# Patient Record
Sex: FEMALE | Race: WHITE | NOT HISPANIC OR LATINO | URBAN - METROPOLITAN AREA
[De-identification: names, ages, dates, MRNs, and addresses within clinical notes are randomized per-mention and may not be internally consistent; named-entity substitution may affect disease eponyms.]

---

## 2021-05-28 ENCOUNTER — OFFICE VISIT (OUTPATIENT)
Dept: URBAN - METROPOLITAN AREA CLINIC 94 | Facility: CLINIC | Age: 31
End: 2021-05-28
Payer: COMMERCIAL

## 2021-05-28 ENCOUNTER — OFFICE VISIT (OUTPATIENT)
Dept: URBAN - METROPOLITAN AREA CLINIC 94 | Facility: CLINIC | Age: 31
End: 2021-05-28

## 2021-05-28 ENCOUNTER — WEB ENCOUNTER (OUTPATIENT)
Dept: URBAN - METROPOLITAN AREA CLINIC 94 | Facility: CLINIC | Age: 31
End: 2021-05-28

## 2021-05-28 VITALS
HEIGHT: 70 IN | DIASTOLIC BLOOD PRESSURE: 65 MMHG | TEMPERATURE: 97.9 F | HEART RATE: 95 BPM | BODY MASS INDEX: 19.9 KG/M2 | WEIGHT: 139 LBS | SYSTOLIC BLOOD PRESSURE: 107 MMHG

## 2021-05-28 DIAGNOSIS — K59.01 CONSTIPATION BY DELAYED COLONIC TRANSIT: ICD-10-CM

## 2021-05-28 DIAGNOSIS — R79.89 ELEVATED LFTS: ICD-10-CM

## 2021-05-28 DIAGNOSIS — R10.9 UNSPECIFIED ABDOMINAL PAIN: ICD-10-CM

## 2021-05-28 DIAGNOSIS — G89.29 OTHER CHRONIC PAIN: ICD-10-CM

## 2021-05-28 PROBLEM — 82423001: Status: ACTIVE | Noted: 2021-05-28

## 2021-05-28 PROCEDURE — 99204 OFFICE O/P NEW MOD 45 MIN: CPT | Performed by: INTERNAL MEDICINE

## 2021-05-28 RX ORDER — HYDROCODONE BITARTRATE AND ACETAMINOPHEN 5; 325 MG/1; MG/1
1 TABLET AS NEEDED TABLET ORAL
Status: ACTIVE | COMMUNITY

## 2021-05-28 RX ORDER — DOXYCYCLINE HYCLATE 100 MG/1
1 TABLET CAPSULE, GELATIN COATED ORAL
Status: ACTIVE | COMMUNITY

## 2021-05-28 RX ORDER — ALBUTEROL SULFATE 90 UG/1
1 PUFF AS NEEDED AEROSOL, METERED RESPIRATORY (INHALATION)
Status: ACTIVE | COMMUNITY

## 2021-05-28 NOTE — HPI-TODAY'S VISIT:
32 y/o F with hx of endometriosis, chronic abdominal pain(question of SB Crohn's) referred here for elevated LFT's  Patient with acute onset fever/body aches, dysuria and b/l flank pain for a week. Seen by PCP and urgent care with negative UCx, COVID, X-ray chest, CT stone protocol, . Treated with abx(macrobid, doxycycline) without benefit. Normal CBC, though LFT's elevated with , ,  with rest normal, acute viral hepatitis negative. CT a/p with question of early R pyelonephritis with similar on ultrasound abd(normal liver) 05/22/21, 08/2020 and prior all normal LFT's.   Today, reports still has fevers, along with some anxiety now that causes some chest tightness(none with exertion) and headache. Is taking doxycycline  Has chronic abdominal pain along with constipation with a hard BM every couple of days. Patient had ileal erosions on colonoscopy, positive IBD serology when seen by [facility] in 2013 with question of Crohn's disease. Seen by [facility] in 2017 with recommendation for CTE, EGD/CLS though not done  Colonoscopy 02/2012: terminal ileal erosions also seen on pill cam at that same time. No granuloma with normal ascending/transverse colon biopsies Pill cam 02/23/12: ileal erosion EGD 2007 and 2011 reportedly unremarkable per [facility] note(mild gastritis), though no reports available

## 2021-07-14 ENCOUNTER — OFFICE VISIT (OUTPATIENT)
Dept: URBAN - METROPOLITAN AREA CLINIC 94 | Facility: CLINIC | Age: 31
End: 2021-07-14

## 2021-09-14 ENCOUNTER — OFFICE VISIT (OUTPATIENT)
Dept: URBAN - METROPOLITAN AREA CLINIC 115 | Facility: CLINIC | Age: 31
End: 2021-09-14

## 2021-11-03 ENCOUNTER — LAB OUTSIDE AN ENCOUNTER (OUTPATIENT)
Dept: URBAN - METROPOLITAN AREA TELEHEALTH 2 | Facility: TELEHEALTH | Age: 31
End: 2021-11-03

## 2021-11-03 ENCOUNTER — OFFICE VISIT (OUTPATIENT)
Dept: URBAN - METROPOLITAN AREA TELEHEALTH 2 | Facility: TELEHEALTH | Age: 31
End: 2021-11-03
Payer: COMMERCIAL

## 2021-11-03 DIAGNOSIS — K63.3 ILEAL EROSIONS: ICD-10-CM

## 2021-11-03 DIAGNOSIS — R11.0 NAUSEA: ICD-10-CM

## 2021-11-03 DIAGNOSIS — K59.01 CONSTIPATION BY DELAYED COLONIC TRANSIT: ICD-10-CM

## 2021-11-03 DIAGNOSIS — R10.84 GENERALIZED ABDOMINAL PAIN: ICD-10-CM

## 2021-11-03 PROBLEM — 35298007: Status: ACTIVE | Noted: 2021-05-28

## 2021-11-03 PROCEDURE — 1036F TOBACCO NON-USER: CPT | Performed by: INTERNAL MEDICINE

## 2021-11-03 PROCEDURE — 99202 OFFICE O/P NEW SF 15 MIN: CPT | Performed by: INTERNAL MEDICINE

## 2021-11-03 PROCEDURE — G8427 DOCREV CUR MEDS BY ELIG CLIN: HCPCS | Performed by: INTERNAL MEDICINE

## 2021-11-03 PROCEDURE — 99213 OFFICE O/P EST LOW 20 MIN: CPT | Performed by: INTERNAL MEDICINE

## 2021-11-03 PROCEDURE — G9903 PT SCRN TBCO ID AS NON USER: HCPCS | Performed by: INTERNAL MEDICINE

## 2021-11-03 RX ORDER — BISACODYL 5 MG
1 -3 TABLETS EACH NIGHT FOR 3 NIGHTS BEFORE STARTING PREP TABLET, DELAYED RELEASE (ENTERIC COATED) ORAL ONCE A DAY
Qty: 9 TABLET | Refills: 0 | OUTPATIENT
Start: 2021-11-03 | End: 2021-11-06

## 2021-11-03 RX ORDER — SODIUM PICOSULFATE, MAGNESIUM OXIDE, AND ANHYDROUS CITRIC ACID 10; 3.5; 12 MG/160ML; G/160ML; G/160ML
TAKE 160 ML LIQUID ORAL
Qty: 1 KIT | Refills: 1 | OUTPATIENT
Start: 2021-11-03 | End: 2021-11-05

## 2021-11-03 RX ORDER — DOXYCYCLINE HYCLATE 100 MG/1
1 TABLET CAPSULE, GELATIN COATED ORAL
Status: ACTIVE | COMMUNITY

## 2021-11-03 RX ORDER — ONDANSETRON HYDROCHLORIDE 4 MG/1
1 TABLET 15 MIN BEFORE EACH DOSE OF COLON PREP TABLET, FILM COATED ORAL TWICE A DAY
Qty: 2 TABLET | Refills: 0 | OUTPATIENT
Start: 2021-11-03

## 2021-11-03 RX ORDER — CHLORDIAZEPOXIDE HYDROCHLORIDE AND CLIDINIUM BROMIDE 5; 2.5 MG/1; MG/1
1 CAPSULE BEFORE MEALS CAPSULE ORAL THREE TIMES A DAY
Qty: 90 | OUTPATIENT
Start: 2021-11-03 | End: 2021-12-02

## 2021-11-03 RX ORDER — ALBUTEROL SULFATE 90 UG/1
1 PUFF AS NEEDED AEROSOL, METERED RESPIRATORY (INHALATION)
Status: ACTIVE | COMMUNITY

## 2021-11-03 NOTE — HPI-TODAY'S VISIT:
Today on 11/3/21, Patient is a 31 year old female present for office visit for evaluation of abdominal pain. Had swollen liver and abnormal kidney tests in May. Told by Id that she must have had a virus. Still has to take tramadol and zofran daily.  Does very bland diet and does not need meds but at other times cannot eat like a normal person.  Pain is usually just uncomfortable. in lower abdomen. Stabbing, Uses a thermopad so she can stand at work. Has tenderness and has nausea. Did everwell food sensitivity test. Did think about elimination diet again when sx bad. Was sensitive to egg white asparagus, chicken, and many other items. can eat plain turkey, plain beef, rice, lettuce, had abnormal ct in 5/2021 and hydroureter not followed up though told no infection at [facility].omeprazole caused hair loss, no relief in past from H2 or levsin  or possibly dicyclomine.  May 2021 labs; , Time spent with patient: -- mins

## 2021-11-24 PROBLEM — 102614006 GENERALIZED ABDOMINAL PAIN: Status: ACTIVE | Noted: 2021-11-24

## 2021-12-23 ENCOUNTER — OFFICE VISIT (OUTPATIENT)
Dept: URBAN - METROPOLITAN AREA SURGERY CENTER 16 | Facility: SURGERY CENTER | Age: 31
End: 2021-12-23
Payer: COMMERCIAL

## 2021-12-23 DIAGNOSIS — K29.60 ADENOPAPILLOMATOSIS GASTRICA: ICD-10-CM

## 2021-12-23 DIAGNOSIS — K63.89 BACTERIAL OVERGROWTH SYNDROME: ICD-10-CM

## 2021-12-23 DIAGNOSIS — K21.00 ALKALINE REFLUX ESOPHAGITIS: ICD-10-CM

## 2021-12-23 DIAGNOSIS — K50.00 CICATRIZING ENTEROCOLITIS: ICD-10-CM

## 2021-12-23 PROCEDURE — G8907 PT DOC NO EVENTS ON DISCHARG: HCPCS | Performed by: INTERNAL MEDICINE

## 2021-12-23 PROCEDURE — 45380 COLONOSCOPY AND BIOPSY: CPT | Performed by: INTERNAL MEDICINE

## 2021-12-23 PROCEDURE — 43239 EGD BIOPSY SINGLE/MULTIPLE: CPT | Performed by: INTERNAL MEDICINE

## 2021-12-23 RX ORDER — ALBUTEROL SULFATE 90 UG/1
1 PUFF AS NEEDED AEROSOL, METERED RESPIRATORY (INHALATION)
Status: ACTIVE | COMMUNITY

## 2021-12-23 RX ORDER — ONDANSETRON HYDROCHLORIDE 4 MG/1
1 TABLET 15 MIN BEFORE EACH DOSE OF COLON PREP TABLET, FILM COATED ORAL TWICE A DAY
Qty: 2 TABLET | Refills: 0 | Status: ACTIVE | COMMUNITY
Start: 2021-11-03

## 2021-12-23 RX ORDER — DOXYCYCLINE HYCLATE 100 MG/1
1 TABLET CAPSULE, GELATIN COATED ORAL
Status: ACTIVE | COMMUNITY

## 2022-01-26 ENCOUNTER — DASHBOARD ENCOUNTERS (OUTPATIENT)
Age: 32
End: 2022-01-26

## 2022-01-26 ENCOUNTER — LAB OUTSIDE AN ENCOUNTER (OUTPATIENT)
Dept: URBAN - METROPOLITAN AREA TELEHEALTH 2 | Facility: TELEHEALTH | Age: 32
End: 2022-01-26

## 2022-01-26 ENCOUNTER — OFFICE VISIT (OUTPATIENT)
Dept: URBAN - METROPOLITAN AREA TELEHEALTH 2 | Facility: TELEHEALTH | Age: 32
End: 2022-01-26
Payer: COMMERCIAL

## 2022-01-26 DIAGNOSIS — R11.0 NAUSEA: ICD-10-CM

## 2022-01-26 DIAGNOSIS — K21.9 GASTROESOPHAGEAL REFLUX DISEASE, UNSPECIFIED WHETHER ESOPHAGITIS PRESENT: ICD-10-CM

## 2022-01-26 DIAGNOSIS — R10.30 LOWER ABDOMINAL PAIN: ICD-10-CM

## 2022-01-26 DIAGNOSIS — K59.00 CONSTIPATION, UNSPECIFIED CONSTIPATION TYPE: ICD-10-CM

## 2022-01-26 PROBLEM — 14760008: Status: ACTIVE | Noted: 2022-01-26

## 2022-01-26 PROBLEM — 235595009: Status: ACTIVE | Noted: 2022-01-26

## 2022-01-26 PROBLEM — 422587007 NAUSEA: Status: ACTIVE | Noted: 2021-11-24

## 2022-01-26 PROCEDURE — 99215 OFFICE O/P EST HI 40 MIN: CPT | Performed by: INTERNAL MEDICINE

## 2022-01-26 PROCEDURE — G8418 CALC BMI BLW LOW PARAM F/U: HCPCS | Performed by: INTERNAL MEDICINE

## 2022-01-26 PROCEDURE — 1036F TOBACCO NON-USER: CPT | Performed by: INTERNAL MEDICINE

## 2022-01-26 PROCEDURE — G9903 PT SCRN TBCO ID AS NON USER: HCPCS | Performed by: INTERNAL MEDICINE

## 2022-01-26 PROCEDURE — G8427 DOCREV CUR MEDS BY ELIG CLIN: HCPCS | Performed by: INTERNAL MEDICINE

## 2022-01-26 RX ORDER — ONDANSETRON HYDROCHLORIDE 4 MG/1
1 TABLET 15 MIN BEFORE EACH DOSE OF COLON PREP TABLET, FILM COATED ORAL TWICE A DAY
Qty: 2 TABLET | Refills: 0 | Status: ACTIVE | COMMUNITY
Start: 2021-11-03

## 2022-01-26 RX ORDER — ALBUTEROL SULFATE 90 UG/1
1 PUFF AS NEEDED AEROSOL, METERED RESPIRATORY (INHALATION)
Status: ACTIVE | COMMUNITY

## 2022-01-26 RX ORDER — FAMOTIDINE 20 MG/1
ONE TABLET BEFORE A MEAL TWICE A DAY TABLET, FILM COATED ORAL TWICE A DAY
Qty: 180 TABLET | Refills: 3 | OUTPATIENT
Start: 2022-01-26

## 2022-01-26 RX ORDER — ONDANSETRON HYDROCHLORIDE 4 MG/1
1 TABLET AS NEEDED TABLET, FILM COATED ORAL TWICE A DAY
Qty: 180 TABLET | Refills: 3
Start: 2021-11-03

## 2022-01-26 NOTE — HPI-TODAY'S VISIT:
May 2021 labs; ,  Today on 11/3/21, Patient is a 31 year old female present for office visit for evaluation of abdominal pain. Had swollen liver and abnormal kidney tests in May. Told by Id that she must have had a virus. Still has to take tramadol and zofran daily.  Does very bland diet and does not need meds but at other times cannot eat like a normal person.  Pain is usually just uncomfortable. in lower abdomen. Stabbing, Uses a thermopad so she can stand at work. Has tenderness and has nausea. Did everwell food sensitivity test. Did think about elimination diet again when sx bad. Was sensitive to egg white asparagus, chicken, and many other items. can eat plain turkey, plain beef, rice, lettuce, had abnormal ct in 5/2021 and hydroureter not followed up though told no infection at [facility].omeprazole caused hair loss, no relief in past from H2 or levsin  or possibly dicyclomine.  1/26/22 Patient is a 31 year old female present for telehealth visit for follow-up after colonoscopy and EGD. Colonoscopy completed 12/23/21 showed hemorrhoids on perianal exam, congested mucosa in cecum, erythematous mucosa terminal ileum. EGD completed 12/23/21 irregular, erosive gastropathy with stigmata of recent bleeding. Admits to constipation, but she reports her symptoms have been improving. She is able to have a bowel movement 2-3 times a week without straining. She does not take any medication for her bowel movements.  Complains of frequent nausea. She states she gets nauseous easily, and she continues to take Zofran. She has been watching her diet to avoid nausea. She reports when she makes her own food, her symptoms are not as frequent. Patient reports multiple food groups make her nauseous. Denies issues eating fatty foods. She is able to eat French fries that only have salt with no issue. Patient is able to eat bland fish and meat. She cannot eat tomato sauce, but she is able to eat jaida sauce without garlic. Patient consumes onions with no problem. She has started an exclusion diet for foods she is sensitive to based on Everwell sensitivity blood test. She has removed 47 foods from her diet, including egg yolks.  Denies headaches with nausea. Her nausea symptoms vary in nature. On occasion, she is nauseous in the morning and on other occasions her symptoms start during the day. Complains of intermittent reflux with nausea symptoms. On occasion, she vomits with nausea. When she vomits, she regurgitate acid and food she previously ate. Her nausea symptoms occur monthly, and she takes 2 Zofran one or twice a day. Complains of lower abdominal pain with eating.  Admits to vertigo on occasion, and she takes generic Dramamine to control symptoms.  CT abdomen pelvis completed 5/26/21 showed mild right sided hydronephrosis and hydroureter.  Patient has a fractured ulna and radius on the right arm, and she had ORIF surgery completed yesterday. Time spent with patient: 37 mins and 7 mins reviewing records

## 2022-01-26 NOTE — PHYSICAL EXAM HENT:
Head , normocephalic , atraumatic , Face , Face within normal limits ,  Mouth and Throat , Oral cavity appearance normal

## 2022-02-01 ENCOUNTER — TELEPHONE ENCOUNTER (OUTPATIENT)
Dept: URBAN - METROPOLITAN AREA CLINIC 92 | Facility: CLINIC | Age: 32
End: 2022-02-01